# Patient Record
Sex: FEMALE | Race: WHITE | Employment: FULL TIME
[De-identification: names, ages, dates, MRNs, and addresses within clinical notes are randomized per-mention and may not be internally consistent; named-entity substitution may affect disease eponyms.]

---

## 2020-08-20 ENCOUNTER — EMPLOYEE WELLNESS (OUTPATIENT)
Dept: OTHER | Facility: CLINIC | Age: 35
End: 2020-08-20

## 2020-08-20 LAB
CHOLEST SERPL-MCNC: 196 MG/DL
GLUCOSE SERPL-MCNC: 82 MG/DL (ref 74–99)
HDLC SERPL-MCNC: 116 MG/DL (ref 40–60)
LDLC SERPL CALC-MCNC: 71.4 MG/DL (ref 0–100)
TRIGL SERPL-MCNC: 43 MG/DL (ref ?–150)

## 2021-09-11 VITALS
BODY MASS INDEX: 19.99 KG/M2 | OXYGEN SATURATION: 100 % | DIASTOLIC BLOOD PRESSURE: 61 MMHG | WEIGHT: 120 LBS | RESPIRATION RATE: 16 BRPM | SYSTOLIC BLOOD PRESSURE: 100 MMHG | HEART RATE: 73 BPM | HEIGHT: 65 IN | TEMPERATURE: 99 F

## 2021-09-11 PROCEDURE — 99283 EMERGENCY DEPT VISIT LOW MDM: CPT

## 2021-09-11 PROCEDURE — 75810000293 HC SIMP/SUPERF WND  RPR

## 2021-09-11 RX ORDER — VITAMIN A ACETATE, BETA CAROTENE, ASCORBIC ACID, CHOLECALCIFEROL, .ALPHA.-TOCOPHEROL ACETATE, DL-, THIAMINE MONONITRATE, RIBOFLAVIN, NIACINAMIDE, PYRIDOXINE HYDROCHLORIDE, FOLIC ACID, CYANOCOBALAMIN, CALCIUM CARBONATE, FERROUS FUMARATE, ZINC OXIDE, CUPRIC OXIDE 3080; 12; 120; 400; 1; 1.84; 3; 20; 22; 920; 25; 200; 27; 10; 2 [IU]/1; UG/1; MG/1; [IU]/1; MG/1; MG/1; MG/1; MG/1; MG/1; [IU]/1; MG/1; MG/1; MG/1; MG/1; MG/1
1 TABLET, FILM COATED ORAL DAILY
COMMUNITY

## 2021-09-12 ENCOUNTER — HOSPITAL ENCOUNTER (EMERGENCY)
Age: 36
Discharge: HOME | End: 2021-09-12
Attending: EMERGENCY MEDICINE
Payer: COMMERCIAL

## 2021-09-12 DIAGNOSIS — S96.121A LACERATION OF RIGHT EXTENSOR HALLUCIS LONGUS TENDON, INITIAL ENCOUNTER: ICD-10-CM

## 2021-09-12 DIAGNOSIS — S91.311A LACERATION OF RIGHT FOOT, INITIAL ENCOUNTER: Primary | ICD-10-CM

## 2021-09-12 PROCEDURE — 75810000293 HC SIMP/SUPERF WND  RPR

## 2021-09-12 NOTE — ED PROVIDER NOTES
EMERGENCY DEPARTMENT HISTORY AND PHYSICAL EXAM    2:17 AM    Date: 9/12/2021  Patient Name: Yonathan Reyna    History of Presenting Illness     Chief Complaint   Patient presents with    Laceration       History Provided By: Patient  Location/Duration/Severity/Modifying factors   Patient is a 79-year-old female presenting to the emergency department with a chief complaint of a laceration to her right foot. Patient reports that she dropped a very sharp knife on her right foot. Patient reports she believes her last tetanus was in the last 10 years is hesitant to a tetanus shot during pregnancy. Reports it was a very clean wound. She notes she cannot dorsiflex her right great toe as she had been previously. No chest pain or shortness of breath or other any other injuries. PCP: None    Current Outpatient Medications   Medication Sig Dispense Refill    prenatal vit-calcium-iron-fa (Prenatal Plus, calcium carb,) 27 mg iron- 1 mg tab Take 1 Tablet by mouth daily. Past History     Past Medical History:  History reviewed. No pertinent past medical history. Past Surgical History:  History reviewed. No pertinent surgical history. Family History:  History reviewed. No pertinent family history. Social History:  Social History     Tobacco Use    Smoking status: Never Smoker    Smokeless tobacco: Never Used   Substance Use Topics    Alcohol use: Not Currently    Drug use: Never       Allergies:  No Known Allergies    I reviewed and confirmed the above information with patient and updated as necessary. Review of Systems     Review of Systems   Constitutional: Negative for chills and fever. HENT: Negative for congestion, rhinorrhea, sinus pressure and sneezing. Eyes: Negative for visual disturbance. Respiratory: Negative for cough, shortness of breath and wheezing. Cardiovascular: Negative for chest pain and leg swelling.    Gastrointestinal: Negative for abdominal pain, diarrhea, nausea and vomiting. Genitourinary: Negative for dysuria, frequency, urgency, vaginal bleeding and vaginal discharge. Musculoskeletal: Negative for back pain, neck pain and neck stiffness. Skin: Positive for wound. Negative for rash. Neurological: Negative for syncope, numbness and headaches. Physical Exam     Visit Vitals  /61 (BP 1 Location: Left arm, BP Patient Position: At rest)   Pulse 73   Temp 99 °F (37.2 °C)   Resp 16   Ht 5' 5\" (1.651 m)   Wt 54.4 kg (120 lb)   SpO2 100%   BMI 19.97 kg/m²       Physical Exam  Constitutional:       General: She is not in acute distress. Appearance: Normal appearance. She is normal weight. HENT:      Right Ear: External ear normal.      Left Ear: External ear normal.      Nose: Nose normal.   Eyes:      Conjunctiva/sclera: Conjunctivae normal.   Cardiovascular:      Rate and Rhythm: Normal rate and regular rhythm. Pulses: Normal pulses. Pulmonary:      Effort: Pulmonary effort is normal.   Musculoskeletal:      Cervical back: Normal range of motion and neck supple. Comments: 2+ DP pulse and PT pulse on the right foot. There is a approximately 1 cm horizontal laceration overlying the dorsum of the right foot. In proximity to the extensor hallucis longus tendon. Patient does have strength in the EHL although it is diminished. Do not appreciate a clear sign of a tendon laceration although clinically seems consistent with 1.   Skin:     General: Skin is warm and dry. Capillary Refill: Capillary refill takes less than 2 seconds. Findings: No rash. Neurological:      Mental Status: She is alert. Diagnostic Study Results     Labs -  No results found for this or any previous visit (from the past 24 hour(s)). Radiologic Studies -   No orders to display           Medical Decision Making   I am the first provider for this patient.     I reviewed the vital signs, available nursing notes, past medical history, past surgical history, family history and social history. Vital Signs-Reviewed the patient's vital signs. EKG: N/A    Records Reviewed: Nursing Notes, Old Medical Records, Previous Radiology Studies and Previous Laboratory Studies (Time of Review: 2:17 AM)    Provider Notes (Medical Decision Making):   MDM  Number of Diagnoses or Management Options  Laceration of right extensor hallucis longus tendon, initial encounter  Laceration of right foot, initial encounter  Diagnosis management comments: Patient is a 68-year-old female who presents to the emergency department with a chief complaint of laceration to her right foot. Appreciate any foreign body reports that the knife was clean. There was no broken pieces of the knife and it was intact and she removed it. She has diminished strength with extension of the right great toe although she is still able to use the EHL tendon. I suspect she at least has a partial tear. We will place her in a postop shoe. Recommended she follow-up with podiatry. I tried to page our podiatrist on call but no one returned my call as it was in the middle of the night. I will have her follow-up. She seems reliable. Either podiatry or orthopedic surgery should be a suitable follow-up plan for her. Advised her certainly to return if she is worsening or changing in the meantime. At this time, patient is stable and appropriate for discharge home.  Patient demonstrates understanding of current diagnoses and is in agreement with the treatment plan. Ashanti Debar are advised that while the likelihood of serious underlying condition is low at this point given the evaluation performed today, we cannot fully rule it out. Ashanti Debar are advised to immediately return with any new symptoms or worsening of current condition.  Any Incidental findings were noted on the patient's discharge paperwork as well as verbally directly to the patient, and the appropriate follow-up was given to the patient as far as instructions on testing needed as well as the timeframe.  All questions have been answered. Jona Damon is given educational material regarding their diagnoses, including danger symptoms and when to return to the ED. This note was dictated utilizing Dragon voice recognition software. Unfortunately this leads to occasional typographical errors. I apologize in advance if the situation occurs. If questions occur please do not hesitate to contact me directly. Dayton Karimi DO          ED Course: Progress Notes, Reevaluation, and Consults:         Wound Repair    Date/Time: 9/12/2021 7:30 AM  Performed by: attendingPreparation: skin prepped with Shur-Clens  Location details: right foot  Wound length:2.5 cm or less  Anesthesia: local infiltration    Anesthesia:  Local Anesthetic: lidocaine 1% without epinephrine  Anesthetic total: 1 mL  Foreign bodies: no foreign bodies  Irrigation solution: saline  Irrigation method: syringe  Debridement: none  Skin closure: 4-0 nylon  Number of sutures: 3  Technique: simple  Approximation: close  Patient tolerance: patient tolerated the procedure well with no immediate complications  My total time at bedside, performing this procedure was 16-30 minutes. Critical Care Time: N/A    Diagnosis     Clinical Impression:   1. Laceration of right foot, initial encounter    2.  Laceration of right extensor hallucis longus tendon, initial encounter        Disposition: Discharge    Follow-up Information     Follow up With Specialties Details Why Contact Info    Chelo Gutierrez DPM Podiatry In 2 days  [address]  [phone]      [facility], [address] [place] [address] In 2 days  [address], MD Orthopedic Surgery   [address] [place] [address]  [address] [place] [address]  [phone] [address] [facility] EMERGENCY DEPT Emergency Medicine  As needed, If symptoms worsen; or [facility] Emergency Department [address]  [phone]    Your Primary Care Physician  In 3 days      [facility]   Primary Care Resource [address]  [address] [place] [address] [address] [place] [address] N.    Jefferson Theodore MD Family Medicine   [address] Anahi Dutton MD Internal Medicine In 3 days PCP resource [address]              Patient's Medications   Start Taking    No medications on file   Continue Taking    PRENATAL VIT-CALCIUM-IRON-FA (PRENATAL PLUS, CALCIUM CARB,) 27 MG IRON- 1 MG TAB    Take 1 Tablet by mouth daily. These Medications have changed    No medications on file   Stop Taking    No medications on file       Keeley Olivera DO   Emergency Medicine   September 12, 2021, 2:17 AM     This note is dictated utilizing Dragon voice recognition software. Unfortunately this leads to occasional typographical errors using the voice recognition. I apologize in advance if the situation occurs. If questions occur please do not hesitate to contact me directly.     Keeley Olivera DO

## 2021-09-12 NOTE — DISCHARGE INSTRUCTIONS
Remove the sutures in 7-10 days. Follow up with Podiatry/Orthopedics. I also provided you with some primary care resources.

## 2021-09-12 NOTE — ED TRIAGE NOTES
Alert and oriented female with laceration to top of right foot from knife falling onto foot. No active bleeding at this time. Last tetanus unknown. Patient approx 18wks gestation.

## 2021-09-13 ENCOUNTER — OFFICE VISIT (OUTPATIENT)
Dept: ORTHOPEDIC SURGERY | Age: 36
End: 2021-09-13
Payer: COMMERCIAL

## 2021-09-13 VITALS
TEMPERATURE: 96.9 F | RESPIRATION RATE: 16 BRPM | HEIGHT: 65 IN | HEART RATE: 97 BPM | OXYGEN SATURATION: 96 % | BODY MASS INDEX: 21.16 KG/M2 | WEIGHT: 127 LBS

## 2021-09-13 DIAGNOSIS — S96.929A TOE LACERATION INVOLVING TENDON, INITIAL ENCOUNTER: Primary | ICD-10-CM

## 2021-09-13 DIAGNOSIS — M79.671 RIGHT FOOT PAIN: ICD-10-CM

## 2021-09-13 DIAGNOSIS — S91.119A TOE LACERATION INVOLVING TENDON, INITIAL ENCOUNTER: Primary | ICD-10-CM

## 2021-09-13 PROCEDURE — 99202 OFFICE O/P NEW SF 15 MIN: CPT | Performed by: ORTHOPAEDIC SURGERY

## 2021-09-13 NOTE — PROGRESS NOTES
AMBULATORY PROGRESS NOTE      Patient: Isabell Guerrero             MRN: [id number]     SSN: [id number] Body mass index is 21.13 kg/m². YOB: 1985     AGE: 28 y.o. EX: female    PCP: None       IMPRESSION //  DIAGNOSIS AND TREATMENT PLAN        Isabell Guerrero has a diagnosis of:          DIAGNOSES    1. Toe laceration involving tendon, initial encounter    2. Right foot pain        Orders Placed This Encounter    Generic Supply Order     Right CAM walker boot was given and placed on patient        PLAN:    1. I placed a Band-Aid over right foot. 2. DME: Right CAM walker boot was given and placed on patient: For comfort, and to help protect her forefoot, and help minimize her tendon from having too much excursion. RTO- we will continue to follow this closely    Isabell Guerrero  expresses understanding of the diagnosis, treatment plan, and all of their proposed questions were answered to their satisfaction. Patient education has been provided re the diagnoses. [facility] //  [address] IS A 28 y.o. female who is a/an  new patient, presenting to my outpatient office for evaluation of  the following chief complaint(s):     Chief Complaint   Patient presents with    Foot Pain     right       Isabell Guerrero present today, having sustained an injury to her right dorsal proximal forefoot, for which his injury occurred on 9/11/2021. [facility], on the date of injury, 9/11/2021 and she presented with an injury, specific: She is using a knife, and cleaning her house, the knife had fallen directly on the dorsal part of her right foot. She states the ED team, at [facility], clean the area, and placed 3 stitches into her foot.   Shortly she has some numbness to the dorsal medial portion of her right great toe, but the sensation, is improved, but has not totally returned, to the distal tip of her right great toe and the distal medial portion right great toe distal aspect. She is about 20 weeks pregnant, and her OB/GYN physician, and is the [employer] system. .    She communicated with her, over the weekend, after she sent me a picture of her foot, and we talked, and she had inability to extend her toe, my concern was that she had a EHL tendon rupture or laceration of the right great toe, as well as possibly injuring the one of the terminal branches that supplies the medial portion right of the right great toe, terminal branch of the SPN nerve. At [facility], reviewed routinely do not operate on pregnant patients, as we have no OB staff her OB physicians. When I spoke with her this weekend, my concern was that I want her to be seen by Elizabeth Lima, [address] [place] [address], as he does surgery and [address] [place] [address], also where they have OB coverage. This is her first pregnancy. I completed paperwork, for her insurance, emphasizing that she be seen right away, and that is my opinion she has no prior surgical invention, for primary repair of her right extensor hallux longus tendon. I communicate with Dr. Enedina Ballard. Douglas Franco, he is willing to see her and care for her. So I will do my best to make this happen. Visit Vitals  Pulse 97   Temp 96.9 °F (36.1 °C)   Resp 16   Ht 5' 5\" (1.651 m)   Wt 127 lb (57.6 kg)   SpO2 96%   BMI 21.13 kg/m²       Appearance: Alert, well appearing and pleasant patient who is in no distress, oriented to person, place/time, and who follows commands. This patient is accompanied in the examination room by her  self. There is signs of: no dementia  Psychiatric: Affect/mood are appropriate. Speech normal in context and clarity, memory intact grossly, no involuntary movements - tremors. Patient arrives to office via: with assistive device: R  Hard sole shoe  H EENT (2): Head normocephalic & atraumatic.   Eye: pupils are round// EOM are intact // Neck: ROM WNL  // Hearings Intact   Respiratory: Breathing non labored     ANKLE/FOOT right    Gait: uses assistive device ( R Hard Sole shoe)  Tenderness: mild over her 1.5 cm horizontal laceration    Cutaneous: Approximately 1.5 cm horizontal laceration laceration to right foot: Dorsal proximal forefoot  Joint Motion: Her EHL tendon, is not functioning. Her she has normal motion at the IP joint, I did not stress the MTP joint, of the great toe. Better joint motion is still smooth and supple  Joint / Tendon Stability: No Ankle or Subtalar instability or joint laxity. No peroneal sublux ability or dislocation  Alignment: neutral Hindfoot,    Neuro Motor/Sensory: NL/NL  Vascular: NL foot/ankle pulses,   Lymphatics: No extremity lymphedema, No calf swelling, no tenderness to calf muscles. CHART REVIEW     Abilio Mcpherson has been experiencing pain and discomfort confirmed as outlined in the pain assessment outlined below.  was reviewed by Ewelina Gamble MD on 9/13/2021. Pain Assessment  9/13/2021   Location of Pain Foot   Location Modifiers Right   Severity of Pain 0        Abilio Mcpherson  has no past medical history on file. Patients is employed at:         History reviewed. No pertinent past medical history. History reviewed. No pertinent surgical history. Current Outpatient Medications   Medication Sig    prenatal vit-calcium-iron-fa (Prenatal Plus, calcium carb,) 27 mg iron- 1 mg tab Take 1 Tablet by mouth daily. No current facility-administered medications for this visit. No Known Allergies  Social History     Occupational History    Not on file   Tobacco Use    Smoking status: Never Smoker    Smokeless tobacco: Never Used   Substance and Sexual Activity    Alcohol use: Not Currently    Drug use: Never    Sexual activity: Not on file     History reviewed. No pertinent family history.      DIAGNOSTIC LAB DATA      No results found for: HBA1C, TSX8ANZM, PGN1JMGO //   Lab Results   Component Value Date/Time    Glucose 82 08/20/2020 06:36 AM        No results found for: NNQ8ALWW, VBV9SEOP      No results found for: VITD3, Gera Fát[place], XQVID, VD3RIA, PWMH94LCAIN      REVIEW OF SYSTEMS : 9/13/2021  ALL BELOW ARE Negative except : SEE HPI     All other systems reviewed and are negative. 12 point review of systems otherwise negative unless noted in HPI. DIAGNOSTIC IMAGING /ORDERS       Orders Placed This Encounter    Generic Supply Order     Right CAM walker boot was given and placed on patient        No x-rays today: Patient's to 20 weeks with intrauterine pregnancy first child:      I have reviewed the results of the above study. The interpretation of this study is my professional opinion. On this date 09/13/2021 I have spent 20 minutes reviewing previous notes, test results and face to face with the patient discussing the diagnosis and importance of compliance with the treatment plan as well as documenting on the day of the visit. An electronic signature was used to authenticate this note. Disclaimer: Sections of this note are dictated using utilizing voice recognition software, which may have resulted in some phonetic based errors in grammar and contents. Even though attempts were made to correct all the mistakes, some may have been missed, and remained in the body of the document. If questions arise, please contact our department. Red Johnson may have a reminder for a \"due or due soon\" health maintenance. I have asked that she contact her primary care provider for follow-up on this health maintenance.     Jesse Bruce, as dictated by , Gaston Emmanuel  9/13/2021  2:08 PM

## 2022-04-11 ENCOUNTER — HOSPITAL ENCOUNTER (OUTPATIENT)
Dept: LAB | Age: 37
Discharge: HOME | End: 2022-04-11
Payer: COMMERCIAL

## 2022-04-11 ENCOUNTER — OFFICE VISIT (OUTPATIENT)
Dept: FAMILY MEDICINE CLINIC | Age: 37
End: 2022-04-11
Payer: COMMERCIAL

## 2022-04-11 VITALS
RESPIRATION RATE: 18 BRPM | WEIGHT: 135 LBS | TEMPERATURE: 98.9 F | HEART RATE: 71 BPM | HEIGHT: 65 IN | SYSTOLIC BLOOD PRESSURE: 100 MMHG | DIASTOLIC BLOOD PRESSURE: 62 MMHG | BODY MASS INDEX: 22.49 KG/M2 | OXYGEN SATURATION: 99 %

## 2022-04-11 DIAGNOSIS — Z13.220 SCREENING FOR HYPERLIPIDEMIA: ICD-10-CM

## 2022-04-11 DIAGNOSIS — R10.13 EPIGASTRIC PAIN: ICD-10-CM

## 2022-04-11 DIAGNOSIS — Z00.00 WELL WOMAN EXAM (NO GYNECOLOGICAL EXAM): ICD-10-CM

## 2022-04-11 DIAGNOSIS — Z13.1 SCREENING FOR DIABETES MELLITUS: ICD-10-CM

## 2022-04-11 DIAGNOSIS — R10.13 EPIGASTRIC PAIN: Primary | ICD-10-CM

## 2022-04-11 DIAGNOSIS — Z80.3 FAMILY HISTORY OF BREAST CANCER: ICD-10-CM

## 2022-04-11 DIAGNOSIS — Z83.49 FAMILY HISTORY OF THYROID DISORDER: ICD-10-CM

## 2022-04-11 LAB
ALBUMIN SERPL-MCNC: 4.2 G/DL (ref 3.4–5)
ALBUMIN/GLOB SERPL: 1.4 {RATIO} (ref 0.8–1.7)
ALP SERPL-CCNC: 104 U/L (ref 45–117)
ALT SERPL-CCNC: 30 U/L (ref 13–56)
ANION GAP SERPL CALC-SCNC: 7 MMOL/L (ref 3–18)
APPEARANCE UR: CLEAR
AST SERPL-CCNC: 20 U/L (ref 10–38)
BILIRUB SERPL-MCNC: 0.8 MG/DL (ref 0.2–1)
BILIRUB UR QL: NEGATIVE
BUN SERPL-MCNC: 16 MG/DL (ref 7–18)
BUN/CREAT SERPL: 25 (ref 12–20)
CALCIUM SERPL-MCNC: 9.4 MG/DL (ref 8.5–10.1)
CHLORIDE SERPL-SCNC: 108 MMOL/L (ref 100–111)
CHOLEST SERPL-MCNC: 193 MG/DL
CO2 SERPL-SCNC: 27 MMOL/L (ref 21–32)
COLOR UR: YELLOW
CREAT SERPL-MCNC: 0.63 MG/DL (ref 0.6–1.3)
ERYTHROCYTE [DISTWIDTH] IN BLOOD BY AUTOMATED COUNT: 12.6 % (ref 11.6–14.5)
EST. AVERAGE GLUCOSE BLD GHB EST-MCNC: 97 MG/DL
GLOBULIN SER CALC-MCNC: 3.1 G/DL (ref 2–4)
GLUCOSE SERPL-MCNC: 77 MG/DL (ref 74–99)
GLUCOSE UR STRIP.AUTO-MCNC: NEGATIVE MG/DL
HBA1C MFR BLD: 5 % (ref 4.2–5.6)
HCT VFR BLD AUTO: 40.9 % (ref 35–45)
HDLC SERPL-MCNC: 100 MG/DL (ref 40–60)
HDLC SERPL: 1.9 {RATIO} (ref 0–5)
HGB BLD-MCNC: 13 G/DL (ref 12–16)
HGB UR QL STRIP: NEGATIVE
KETONES UR QL STRIP.AUTO: NEGATIVE MG/DL
LDLC SERPL CALC-MCNC: 86.8 MG/DL (ref 0–100)
LEUKOCYTE ESTERASE UR QL STRIP.AUTO: NEGATIVE
LIPASE SERPL-CCNC: 70 U/L (ref 73–393)
LIPID PROFILE,FLP: ABNORMAL
MCH RBC QN AUTO: 29 PG (ref 24–34)
MCHC RBC AUTO-ENTMCNC: 31.8 G/DL (ref 31–37)
MCV RBC AUTO: 91.3 FL (ref 78–100)
NITRITE UR QL STRIP.AUTO: NEGATIVE
NRBC # BLD: 0 K/UL (ref 0–0.01)
NRBC BLD-RTO: 0 PER 100 WBC
PH UR STRIP: 5 [PH] (ref 5–8)
PLATELET # BLD AUTO: 230 K/UL (ref 135–420)
PMV BLD AUTO: 11.6 FL (ref 9.2–11.8)
POTASSIUM SERPL-SCNC: 4.1 MMOL/L (ref 3.5–5.5)
PROT SERPL-MCNC: 7.3 G/DL (ref 6.4–8.2)
PROT UR STRIP-MCNC: NEGATIVE MG/DL
RBC # BLD AUTO: 4.48 M/UL (ref 4.2–5.3)
SODIUM SERPL-SCNC: 142 MMOL/L (ref 136–145)
SP GR UR REFRACTOMETRY: 1.03 (ref 1–1.03)
TRIGL SERPL-MCNC: 31 MG/DL (ref ?–150)
TSH SERPL DL<=0.05 MIU/L-ACNC: 1.13 UIU/ML (ref 0.36–3.74)
UROBILINOGEN UR QL STRIP.AUTO: 0.2 EU/DL (ref 0.2–1)
VLDLC SERPL CALC-MCNC: 6.2 MG/DL
WBC # BLD AUTO: 4.6 K/UL (ref 4.6–13.2)

## 2022-04-11 PROCEDURE — 83690 ASSAY OF LIPASE: CPT

## 2022-04-11 PROCEDURE — 85027 COMPLETE CBC AUTOMATED: CPT

## 2022-04-11 PROCEDURE — 80061 LIPID PANEL: CPT

## 2022-04-11 PROCEDURE — 99203 OFFICE O/P NEW LOW 30 MIN: CPT | Performed by: FAMILY MEDICINE

## 2022-04-11 PROCEDURE — 83036 HEMOGLOBIN GLYCOSYLATED A1C: CPT

## 2022-04-11 PROCEDURE — 81003 URINALYSIS AUTO W/O SCOPE: CPT

## 2022-04-11 PROCEDURE — 80053 COMPREHEN METABOLIC PANEL: CPT

## 2022-04-11 PROCEDURE — 36415 COLL VENOUS BLD VENIPUNCTURE: CPT

## 2022-04-11 PROCEDURE — 84443 ASSAY THYROID STIM HORMONE: CPT

## 2022-04-11 NOTE — PATIENT INSTRUCTIONS
Well Visit, Ages 25 to 48: Care Instructions  Overview     Well visits can help you stay healthy. Your doctor has checked your overall health and may have suggested ways to take good care of yourself. Your doctor also may have recommended tests. At home, you can help prevent illness with healthy eating, regular exercise, and other steps. Follow-up care is a key part of your treatment and safety. Be sure to make and go to all appointments, and call your doctor if you are having problems. It's also a good idea to know your test results and keep a list of the medicines you take. How can you care for yourself at home? · Get screening tests that you and your doctor decide on. Screening helps find diseases before any symptoms appear. · Eat healthy foods. Choose fruits, vegetables, whole grains, protein, and low-fat dairy foods. Limit fat, especially saturated fat. Reduce salt in your diet. · Limit alcohol. If you are a man, have no more than 2 drinks a day or 14 drinks a week. If you are a woman, have no more than 1 drink a day or 7 drinks a week. · Get at least 30 minutes of physical activity on most days of the week. Walking is a good choice. You also may want to do other activities, such as running, swimming, cycling, or playing tennis or team sports. Discuss any changes in your exercise program with your doctor. · Reach and stay at a healthy weight. This will lower your risk for many problems, such as obesity, diabetes, heart disease, and high blood pressure. · Do not smoke or allow others to smoke around you. If you need help quitting, talk to your doctor about stop-smoking programs and medicines. These can increase your chances of quitting for good. · Care for your mental health. It is easy to get weighed down by worry and stress. Learn strategies to manage stress, like deep breathing and mindfulness, and stay connected with your family and community.  If you find you often feel sad or hopeless, talk with your doctor. Treatment can help. · Talk to your doctor about whether you have any risk factors for sexually transmitted infections (STIs). You can help prevent STIs if you wait to have sex with a new partner (or partners) until you've each been tested for STIs. It also helps if you use condoms (male or female condoms) and if you limit your sex partners to one person who only has sex with you. Vaccines are available for some STIs, such as HPV. · Use birth control if it's important to you to prevent pregnancy. Talk with your doctor about the choices available and what might be best for you. · If you think you may have a problem with alcohol or drug use, talk to your doctor. This includes prescription medicines (such as amphetamines and opioids) and illegal drugs (such as cocaine and methamphetamine). Your doctor can help you figure out what type of treatment is best for you. · Protect your skin from too much sun. When you're outdoors from 10 a.m. to 4 p.m., stay in the shade or cover up with clothing and a hat with a wide brim. Wear sunglasses that block UV rays. Even when it's cloudy, put broad-spectrum sunscreen (SPF 30 or higher) on any exposed skin. · See a dentist one or two times a year for checkups and to have your teeth cleaned. · Wear a seat belt in the car. When should you call for help? Watch closely for changes in your health, and be sure to contact your doctor if you have any problems or symptoms that concern you. Where can you learn more? Go to http://www.[employer].com/  Enter P072 in the search box to learn more about \"Well Visit, Ages 25 to 48: Care Instructions. \"  Current as of: October 6, 2021               Content Version: 13.2  © 5914-2203 [employer], [employer]. Care instructions adapted under license by [employer] (which disclaims liability or warranty for this information).  If you have questions about a medical condition or this instruction, always ask your healthcare professional. [place] [address] any warranty or liability for your use of this information.

## 2022-04-11 NOTE — PROGRESS NOTES
HISTORY OF PRESENT ILLNESS  Marcos Vásquez is a 39 y.o. female. HPI: Here as a new patient to establish care. Recently had childbirth 9 weeks ago. Breast-feeding. Family history of breast cancer. Maternal grandmother and mother. Mother was around 46 when she was diagnosed with the breast cancer. No concern on self breast exam.  Family history of thyroid problem. She wants to check the thyroid function. No recent weight or appetite change. No mood change. No unusual fatigue. Has not received her menstrual cycle yet. Concern with upper abdominal pain. On and off. Going on since few days. She stated she is stomach sleeper but now since few days she feels it more discomfort over the epigastric area towards early morning. No nausea or vomiting. No diarrhea or constipation. No GERD symptoms. Appetite and weight has been stable. No blood in the stool or urinary complaint. On exam noted more discomfort over the left lower abdominal area. Again no urinary complaint. No fever. No cold cough. Visit Vitals  /62 (BP 1 Location: Left arm, BP Patient Position: Sitting, BP Cuff Size: Large adult)   Pulse 71   Temp 98.9 °F (37.2 °C) (Temporal)   Resp 18   Ht 5' 5\" (1.651 m)   Wt 135 lb (61.2 kg)   SpO2 99%   Breastfeeding Unknown   BMI 22.47 kg/m²     Patient Active Problem List    Diagnosis Date Noted    Family history of breast cancer 04/11/2022     Current Outpatient Medications   Medication Sig Dispense Refill    OTHER 1 Capsule two (2) times a day. [place] Lecithin Soy Base 1200 mg      prenatal vit-calcium-iron-fa (Prenatal Plus, calcium carb,) 27 mg iron- 1 mg tab Take 1 Tablet by mouth daily. No Known Allergies  History reviewed. No pertinent past medical history. History reviewed. No pertinent surgical history. History reviewed. No pertinent family history.   Social History     Tobacco Use    Smoking status: Never Smoker    Smokeless tobacco: Never Used   Substance Use Topics    Alcohol use: Yes     Comment: Rare          ROS: See HPI    Physical Exam  Constitutional:       General: She is not in acute distress. Cardiovascular:      Rate and Rhythm: Normal rate and regular rhythm. Heart sounds: Normal heart sounds. Abdominal:      General: Bowel sounds are normal.      Palpations: Abdomen is soft. Tenderness: There is abdominal tenderness (discomfort over left lower qaudrant ). There is no guarding or rebound. Neurological:      Mental Status: She is oriented to person, place, and time. Psychiatric:         Behavior: Behavior normal.         ASSESSMENT and PLAN    ICD-10-CM ICD-9-CM    1. Epigastric pain: On exam more discomfort over the left lower abdominal area. No nausea or vomiting, no appetite or weight changes. No urinary or bowel complaint. No GERD symptoms. Will obtain labs and follow-up after results. Advised to avoid spicy and fried food and drink lots of water. R10.13 789.06 CBC W/O DIFF      METABOLIC PANEL, COMPREHENSIVE      TSH 3RD GENERATION      URINALYSIS W/ RFLX MICROSCOPIC      LIPASE   2. Family history of breast cancer: At this time she is breast-feeding will observe and consider having a baseline mammogram once she is done with breast-feeding. Advised patient to continue self breast exam.  She is comfortable with this plan. Z80.3 V16.3    3. Mother currently breast-feeding  Z39.1 V24.1    4. Well woman exam (no gynecological exam)  Z00.00 V70.0 CBC W/O DIFF      METABOLIC PANEL, COMPREHENSIVE    [V70.0]   5. Family history of thyroid disorder  Z83.49 V18.19    6. Screening for diabetes mellitus  Z13.1 V77.1 HEMOGLOBIN A1C WITH EAG   7. Screening for hyperlipidemia  Z13.220 V77.91 LIPID PANEL   Pt understood and agree with the plan   Review    Follow-up and Dispositions    · Return in about 1 month (around 5/11/2022). Please note that this dictation was completed with "[employer] the computer voice recognition software.   Quite often unanticipated grammatical, syntax, homophones, and other interpretive errors are inadvertently transcribed by the computer software. Please disregard these errors. Please excuse any errors that have escaped final proofreading.

## 2022-04-11 NOTE — PROGRESS NOTES
Subjective:   39 y.o. female for Well Woman Check. Her gyne and breast care is done elsewhere by her Ob-Gyne physician. {Choose one or more SmartLinks; press DELETE if none desired:[id number]}     {Choose one or more Last Lab values; press DELETE if none desired:[id number]}     ROS: Feeling generally well. No TIA's or unusual headaches, no dysphagia. No prolonged cough. No dyspnea or chest pain on exertion. No abdominal pain, change in bowel habits, black or bloody stools. No urinary tract symptoms. No new or unusual musculoskeletal symptoms. Specific concerns today: ***. Objective: The patient appears well, alert, oriented x 3, in no distress. Visit Vitals  /62 (BP 1 Location: Left arm, BP Patient Position: Sitting, BP Cuff Size: Large adult)   Pulse 71   Temp 98.9 °F (37.2 °C) (Temporal)   Resp 18   Ht 5' 5\" (1.651 m)   Wt 135 lb (61.2 kg)   SpO2 99%   Breastfeeding Unknown   BMI 22.47 kg/m²     ENT normal.  Neck supple. No adenopathy or thyromegaly. VIN. Lungs are clear, good air entry, no wheezes, rhonchi or rales. S1 and S2 normal, no murmurs, regular rate and rhythm. Abdomen soft without tenderness, guarding, mass or organomegaly. Extremities show no edema, normal peripheral pulses. Neurological is normal, no focal findings. Breast and Pelvic exams are deferred.     Assessment/Plan:   Well Woman  {plan, general patient:884718}  {Assessment and Plan:31397}

## 2022-04-11 NOTE — PROGRESS NOTES
Chief Complaint   Patient presents with    New Patient    Establish Care     no health concerns (looking for New PCP)      1. \"Have you been to the ER, urgent care clinic since your last visit? Hospitalized since your last visit? \" No    2. \"Have you seen or consulted any other health care providers outside of the [address] [place] [address] since your last visit? \" No     3. For patients aged 39-70: Has the patient had a colonoscopy / FIT/ Cologuard? NA - based on age      If the patient is female:    4. For patients aged 41-77: Has the patient had a mammogram within the past 2 years? NA - based on age or sex      11. For patients aged 21-65: Has the patient had a pap smear?  Yes - no Care Gap present    Health Maintenance Due   Topic Date Due    Hepatitis C Screening  Never done    Depression Screen  Never done    COVID-19 Vaccine (3 - Booster for Pfizer series) 06/28/2021    OB 3RD TRIMESTER TDAP  Never done    Cervical cancer screen  04/23/2022

## 2022-04-12 NOTE — PROGRESS NOTES
Contacted patient and verified identity using name and date of birth (2- identifiers)Spoke with patient and she verbalized understanding of Mildly elevated lipase. I would advise her to go to ER as she was symptomatic. Could be mild pancreatitis.  Drink lots of fluid and avoid heavy diet/ fried and fatty food

## 2022-04-12 NOTE — PROGRESS NOTES
Mildly elevated lipase. I would advise her to go to ER as she was symptomatic. Could be mild pancreatitis. Drink lots of fluid and avoid heavy diet/ fried and fatty food.

## 2022-09-16 LAB
CHOLEST SERPL-MCNC: 205 MG/DL
GLUCOSE SERPL-MCNC: 85 MG/DL (ref 74–99)
HDLC SERPL-MCNC: 120 MG/DL (ref 40–60)
LDLC SERPL CALC-MCNC: 79.6 MG/DL (ref 0–100)
TRIGL SERPL-MCNC: 27 MG/DL (ref ?–150)

## 2023-08-07 LAB
CHOLEST SERPL-MCNC: 162 MG/DL
GLUCOSE SERPL-MCNC: 88 MG/DL (ref 74–99)
HDLC SERPL-MCNC: 101 MG/DL (ref 40–60)
LDLC SERPL CALC-MCNC: 53.6 MG/DL (ref 0–100)
TRIGL SERPL-MCNC: 37 MG/DL